# Patient Record
Sex: FEMALE | Race: WHITE
[De-identification: names, ages, dates, MRNs, and addresses within clinical notes are randomized per-mention and may not be internally consistent; named-entity substitution may affect disease eponyms.]

---

## 2021-04-21 ENCOUNTER — HOSPITAL ENCOUNTER (OUTPATIENT)
Dept: HOSPITAL 96 - M.RAD | Age: 46
End: 2021-04-21
Attending: NURSE PRACTITIONER
Payer: COMMERCIAL

## 2021-04-21 DIAGNOSIS — Z12.31: Primary | ICD-10-CM

## 2021-07-05 ENCOUNTER — HOSPITAL ENCOUNTER (EMERGENCY)
Dept: HOSPITAL 96 - M.ERS | Age: 46
Discharge: HOME | End: 2021-07-05
Payer: COMMERCIAL

## 2021-07-05 VITALS — HEIGHT: 63 IN | BODY MASS INDEX: 26.93 KG/M2 | WEIGHT: 152.01 LBS

## 2021-07-05 VITALS — SYSTOLIC BLOOD PRESSURE: 155 MMHG | DIASTOLIC BLOOD PRESSURE: 92 MMHG
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
## 2021-07-05 DIAGNOSIS — Z90.89: ICD-10-CM
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
## 2021-07-05 DIAGNOSIS — Z88.8: ICD-10-CM

## 2021-07-05 DIAGNOSIS — S20.01XA: ICD-10-CM
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
## 2021-07-05 DIAGNOSIS — Z98.890: ICD-10-CM

## 2021-07-05 DIAGNOSIS — S60.221A: ICD-10-CM

## 2021-07-05 DIAGNOSIS — S61.451A: Primary | ICD-10-CM

## 2021-07-05 DIAGNOSIS — Z90.710: ICD-10-CM

## 2021-07-28 ENCOUNTER — HOSPITAL ENCOUNTER (EMERGENCY)
Dept: HOSPITAL 96 - M.ERS | Age: 46
Discharge: HOME | End: 2021-07-28
Payer: COMMERCIAL

## 2021-07-28 VITALS — BODY MASS INDEX: 42.68 KG/M2 | HEIGHT: 64 IN | WEIGHT: 250 LBS

## 2021-07-28 VITALS — SYSTOLIC BLOOD PRESSURE: 140 MMHG | DIASTOLIC BLOOD PRESSURE: 100 MMHG

## 2021-07-28 DIAGNOSIS — U07.1: Primary | ICD-10-CM

## 2021-07-28 DIAGNOSIS — Z98.890: ICD-10-CM

## 2021-07-28 DIAGNOSIS — Z90.710: ICD-10-CM

## 2021-07-28 DIAGNOSIS — Z88.8: ICD-10-CM

## 2021-08-03 ENCOUNTER — HOSPITAL ENCOUNTER (EMERGENCY)
Dept: HOSPITAL 96 - M.ERS | Age: 46
Discharge: HOME | End: 2021-08-03
Payer: COMMERCIAL

## 2021-08-03 VITALS — DIASTOLIC BLOOD PRESSURE: 85 MMHG | SYSTOLIC BLOOD PRESSURE: 127 MMHG

## 2021-08-03 VITALS — WEIGHT: 250 LBS | HEIGHT: 64 IN | BODY MASS INDEX: 42.68 KG/M2

## 2021-08-03 DIAGNOSIS — E86.0: ICD-10-CM

## 2021-08-03 DIAGNOSIS — Z98.890: ICD-10-CM

## 2021-08-03 DIAGNOSIS — Z88.8: ICD-10-CM

## 2021-08-03 DIAGNOSIS — Z90.710: ICD-10-CM

## 2021-08-03 DIAGNOSIS — E66.9: ICD-10-CM

## 2021-08-03 DIAGNOSIS — U07.1: Primary | ICD-10-CM

## 2021-08-03 LAB
ABSOLUTE BASOPHILS: 0 THOU/UL (ref 0–0.2)
ABSOLUTE EOSINOPHILS: 0 THOU/UL (ref 0–0.7)
ABSOLUTE MONOCYTES: 0.2 THOU/UL (ref 0–1.2)
ALBUMIN SERPL-MCNC: 2.6 G/DL (ref 3.4–5)
ALP SERPL-CCNC: 42 U/L (ref 46–116)
ALT SERPL-CCNC: 36 U/L (ref 30–65)
ANION GAP SERPL CALC-SCNC: 10 MMOL/L (ref 7–16)
AST SERPL-CCNC: 36 U/L (ref 15–37)
BASOPHILS NFR BLD AUTO: 0.9 %
BILIRUB SERPL-MCNC: 0.4 MG/DL
BUN SERPL-MCNC: 10 MG/DL (ref 7–18)
CALCIUM SERPL-MCNC: 6.3 MG/DL (ref 8.5–10.1)
CHLORIDE SERPL-SCNC: 108 MMOL/L (ref 98–107)
CO2 SERPL-SCNC: 23 MMOL/L (ref 21–32)
CREAT SERPL-MCNC: 0.7 MG/DL (ref 0.6–1.3)
EOSINOPHIL NFR BLD: 0 %
GLUCOSE SERPL-MCNC: 104 MG/DL (ref 70–99)
GRANULOCYTES NFR BLD MANUAL: 77 %
HCT VFR BLD CALC: 41.5 % (ref 37–47)
HGB BLD-MCNC: 14.2 GM/DL (ref 12–15)
LYMPHOCYTES # BLD: 0.7 THOU/UL (ref 0.8–5.3)
LYMPHOCYTES NFR BLD AUTO: 17.7 %
MCH RBC QN AUTO: 29.9 PG (ref 26–34)
MCHC RBC AUTO-ENTMCNC: 34.3 G/DL (ref 28–37)
MCV RBC: 87.2 FL (ref 80–100)
MONOCYTES NFR BLD: 4.4 %
MPV: 8.8 FL. (ref 7.2–11.1)
NEUTROPHILS # BLD: 3 THOU/UL (ref 1.6–8.1)
NT-PRO BRAIN NAT PEPTIDE: 24 PG/ML (ref ?–300)
NUCLEATED RBCS: 0 /100WBC
PLATELET COUNT*: 154 THOU/UL (ref 150–400)
POTASSIUM SERPL-SCNC: 3.2 MMOL/L (ref 3.5–5.1)
PROT SERPL-MCNC: 6 G/DL (ref 6.4–8.2)
RBC # BLD AUTO: 4.75 MIL/UL (ref 4.2–5)
RDW-CV: 13.7 % (ref 10.5–14.5)
SODIUM SERPL-SCNC: 141 MMOL/L (ref 136–145)
WBC # BLD AUTO: 3.9 THOU/UL (ref 4–11)

## 2021-08-04 NOTE — EKG
[place] [address]
Phone:  [phone]                     ELECTROCARDIOGRAM REPORT      
_______________________________________________________________________________
 
Name:         ETIENNE AYALA           Room:                     [facility]#:    [id number]     Account #:     [id number]  
Admission:    21    Attend Phys:                     
Discharge:    21    Date of Birth: [birth year]  
Date of Service: 21  Report #:      [id number]
        [id number]
_______________________________________________________________________________
THIS REPORT FOR:  //name//                      
 
                         [facility] ED
                                       
Test Date:    2021               Test Time:    18:25:21
Pat Name:     ETIENNE AYALA        Department:   
Patient ID:   [id number]            Room:          
Gender:                               Technician:   SANDOVAL
:          1975               Requested By: Samia Pollard
Order Number: [id number] MD:   Dharmesh Haddad
                                 Measurements
Intervals                              Axis          
Rate:         121                      P:            24
VA:           146                      QRS:          45
QRSD:         76                       T:            0
QT:           291                                    
QTc:          413                                    
                           Interpretive Statements
Sinus tachycardia
Compared to ECG 2009 18:31:41
Sinus rhythm no longer present
Electronically Signed On 2021 9:37:10 CDT by Dharmesh Haddad
[email or web address]
 
 
 
 
 
 
 
 
 
 
 
 
 
 
 
 
 
 
 
 
 
  <ELECTRONICALLY SIGNED>
                                           By: Dharmesh Haddad MD, Merged with [facility]      
  21     0937
D: 21 1825   _____________________________________
T: 21 1825   Dharmesh Haddad MD, Merged with [facility]        /EPI